# Patient Record
Sex: MALE | Race: WHITE | NOT HISPANIC OR LATINO
[De-identification: names, ages, dates, MRNs, and addresses within clinical notes are randomized per-mention and may not be internally consistent; named-entity substitution may affect disease eponyms.]

---

## 2023-02-24 PROBLEM — Z00.00 ENCOUNTER FOR PREVENTIVE HEALTH EXAMINATION: Status: ACTIVE | Noted: 2023-02-24

## 2023-03-06 ENCOUNTER — APPOINTMENT (OUTPATIENT)
Dept: UROLOGY | Facility: CLINIC | Age: 68
End: 2023-03-06
Payer: MEDICARE

## 2023-03-06 VITALS
RESPIRATION RATE: 18 BRPM | SYSTOLIC BLOOD PRESSURE: 147 MMHG | HEIGHT: 73 IN | BODY MASS INDEX: 24.52 KG/M2 | TEMPERATURE: 97.8 F | DIASTOLIC BLOOD PRESSURE: 89 MMHG | WEIGHT: 185 LBS | OXYGEN SATURATION: 98 % | HEART RATE: 103 BPM

## 2023-03-06 DIAGNOSIS — F17.210 NICOTINE DEPENDENCE, CIGARETTES, UNCOMPLICATED: ICD-10-CM

## 2023-03-06 DIAGNOSIS — I10 ESSENTIAL (PRIMARY) HYPERTENSION: ICD-10-CM

## 2023-03-06 DIAGNOSIS — E78.00 PURE HYPERCHOLESTEROLEMIA, UNSPECIFIED: ICD-10-CM

## 2023-03-06 PROCEDURE — 99204 OFFICE O/P NEW MOD 45 MIN: CPT

## 2023-03-08 NOTE — ADDENDUM
[FreeTextEntry1] : Documented by DOUG Koenig acting as a scribe for Dr. Kristen Coon \par \par All medical record entries made by the Scribe were at my, Dr. Coon direction and\par personally dictated by me.  I have reviewed the chart and agree that the record\par accurately reflects my personal performance of the history, physical exam, procedure and imaging.  \par  \par \par

## 2023-03-08 NOTE — LETTER BODY
[Dear  ___] : Dear ~EFREN, [Consult Letter:] : I had the pleasure of evaluating your patient, [unfilled]. [Please see my note below.] : Please see my note below. [Sincerely,] : Sincerely, [FreeTextEntry3] : Kristen Coon MD, FACS\par

## 2023-03-08 NOTE — HISTORY OF PRESENT ILLNESS
[FreeTextEntry1] : Alonzo is a 68-year-old male diagnosed with prostate cancer approximately 1 year ago with reported [place] grade of 8.  He was treated with radiation with Dr. Renan Allan and was receiving androgen deprivation therapy with Dr. Albarado.  Patient presents to office today to transfer care as his insurance has changed and Dr. Albarado no longer takes his insurance.\par \par Patient reports feeling well he denies any difficulties urinating.  He denies any bone pains and unexplained weight loss.\par \par His last hormone injection was approximately 3 months ago and he is unsure of how long it was recommended that he remain on hormone injections.\par \par No prior records available for review.\par \par His most recent PSA February 2023 is undetectable.

## 2023-03-08 NOTE — ASSESSMENT
[FreeTextEntry1] : 68-year-old with reported Norma 8 prostate cancer\par Treated with radiation and androgen deprivation therapy reportedly.\par He currently feels well.\par \par Transferring care due to changes of insurance.  No prior records available for review.\par \par I explained to patient the importance of his prior records from his prior urologist as well as his radiation oncologist.  I explained importance of continuity of care.\par \par Plan\par -Obtain prior records\par -Follow-up 3 weeks to review and to assess need for further ADT

## 2023-03-09 ENCOUNTER — NON-APPOINTMENT (OUTPATIENT)
Age: 68
End: 2023-03-09

## 2023-03-15 ENCOUNTER — NON-APPOINTMENT (OUTPATIENT)
Age: 68
End: 2023-03-15

## 2023-03-23 ENCOUNTER — APPOINTMENT (OUTPATIENT)
Dept: UROLOGY | Facility: CLINIC | Age: 68
End: 2023-03-23
Payer: MEDICARE

## 2023-03-23 VITALS
RESPIRATION RATE: 18 BRPM | SYSTOLIC BLOOD PRESSURE: 138 MMHG | WEIGHT: 185 LBS | BODY MASS INDEX: 24.52 KG/M2 | HEART RATE: 97 BPM | HEIGHT: 73 IN | TEMPERATURE: 97.1 F | OXYGEN SATURATION: 98 % | DIASTOLIC BLOOD PRESSURE: 77 MMHG

## 2023-03-23 PROCEDURE — 96402 CHEMO HORMON ANTINEOPL SQ/IM: CPT

## 2023-03-24 NOTE — HISTORY OF PRESENT ILLNESS
[FreeTextEntry1] : Alonzo is a 68-year-old male diagnosed with prostate cancer approximately 1 year ago with reported [place] grade of 8. He was treated with radiation with Dr. Renan Allan and was receiving androgen deprivation therapy with Dr. Albarado. Patient presents to office today to transfer care as his insurance has changed and Dr. Albarado no longer takes his insurance\par \par He presents to office today to review his prior records and for Eligard injection. His prior records were reviewed. \par Please see Chart note from 03/15/2023. \par \par As per Radiation Oncologist, it is recommended that patient continue ADT for a total of 18 months. With his prior Urologist, patient received 3 injections of Eligard 4 month depot (12 months). Due for 6 months of ADT.\par \par Patient reports feeling well. Denies dysuria and gross hematuria. \par Has follow up with Dr. Allan (radiation oncologist) in June 2023.

## 2023-03-24 NOTE — ASSESSMENT
[FreeTextEntry1] : 68 year old with multifocal prostate cancer. Norma 8 in 1 core. \par \par Old records reviewed. \par As per Radiation oncology, recommended that patient receive total of 18 months of ADT.\par Patient received 12 months with prior Urologist. \par \par He presents today for Eligard injection. \par Side effects of Eligard and indication for injection reviewed with patient. Patient verbalized understanding and all questions answered. \par \par Plan\par -Eligard today. See hormone note. \par -Follow up 3 months for final Eligard injection. \par

## 2023-06-26 ENCOUNTER — APPOINTMENT (OUTPATIENT)
Dept: UROLOGY | Facility: CLINIC | Age: 68
End: 2023-06-26
Payer: MEDICARE

## 2023-06-26 PROCEDURE — 96402 CHEMO HORMON ANTINEOPL SQ/IM: CPT

## 2023-09-28 ENCOUNTER — APPOINTMENT (OUTPATIENT)
Dept: UROLOGY | Facility: CLINIC | Age: 68
End: 2023-09-28
Payer: MEDICARE

## 2023-09-28 VITALS
DIASTOLIC BLOOD PRESSURE: 89 MMHG | SYSTOLIC BLOOD PRESSURE: 154 MMHG | RESPIRATION RATE: 18 BRPM | OXYGEN SATURATION: 98 % | HEIGHT: 73 IN | TEMPERATURE: 97 F | WEIGHT: 185 LBS | BODY MASS INDEX: 24.52 KG/M2 | HEART RATE: 89 BPM

## 2023-09-28 PROCEDURE — 99213 OFFICE O/P EST LOW 20 MIN: CPT

## 2024-03-28 ENCOUNTER — APPOINTMENT (OUTPATIENT)
Dept: UROLOGY | Facility: CLINIC | Age: 69
End: 2024-03-28
Payer: MEDICARE

## 2024-03-28 VITALS
BODY MASS INDEX: 24.52 KG/M2 | HEART RATE: 91 BPM | DIASTOLIC BLOOD PRESSURE: 84 MMHG | HEIGHT: 73 IN | WEIGHT: 185 LBS | SYSTOLIC BLOOD PRESSURE: 157 MMHG | OXYGEN SATURATION: 94 %

## 2024-03-28 DIAGNOSIS — C61 MALIGNANT NEOPLASM OF PROSTATE: ICD-10-CM

## 2024-03-28 PROCEDURE — 99214 OFFICE O/P EST MOD 30 MIN: CPT

## 2024-03-28 RX ORDER — ROSUVASTATIN CALCIUM 5 MG/1
5 TABLET, FILM COATED ORAL
Refills: 0 | Status: ACTIVE | COMMUNITY

## 2024-03-28 RX ORDER — ENALAPRIL MALEATE 5 MG/1
5 TABLET ORAL
Refills: 0 | Status: ACTIVE | COMMUNITY

## 2024-03-28 NOTE — ASSESSMENT
[FreeTextEntry1] : 69-year-old male diagnosed with prostate cancer approximately 1 year ago with reported [place] grade of 8.  He was treated with radiation with Dr. Renan Allan and was receiving androgen deprivation therapy with Dr. Albarado.  Patient presents to office today to transfer care as his insurance has changed and Dr. Albarado no longer takes his insurance  completed 18 months of ADT 6/2023  Patient reports feeling well. Denies dysuria and gross hematuria.  still with some hot flashes - but otherwise feels well  PSA <0.1 ng/ml 03/2024 PSA <0.1 ng/ml 12/2023 PSA <0.1 ng/ml 9/2023  he offers no complaints feels well  voiding without incident no adverse events associated with ADT were reported  Plan reviewed prior lab studies discussed the plan of care in detail - PSA with Dr. Sanders - f/u in 1 year to review possible PSA draw if not available at time of next visit

## 2024-03-28 NOTE — LETTER BODY
[Dear  ___] : Dear  [unfilled], [Consult Letter:] : I had the pleasure of evaluating your patient, [unfilled]. [Please see my note below.] : Please see my note below. [Sincerely,] : Sincerely, [FreeTextEntry3] : Kristen Coon MD, FACS

## 2024-03-28 NOTE — HISTORY OF PRESENT ILLNESS
[FreeTextEntry1] : 69-year-old male diagnosed with prostate cancer approximately 1 year ago with reported [place] grade of 8.  He was treated with radiation with Dr. Renan Allan and was receiving androgen deprivation therapy with Dr. Albarado.  Patient presents to office today to transfer care as his insurance has changed and Dr. Albarado no longer takes his insurance  completed 18 months of ADT 6/2023  Patient reports feeling well. Denies dysuria and gross hematuria.  still with some hot flashes - but otherwise feels well  PSA <0.1 ng/ml 03/2024 PSA <0.1 ng/ml 12/2023 PSA <0.1 ng/ml 9/2023  he offers no complaints feels well  voiding without incident no adverse events associated with ADT were reported [Urinary Urgency] : no urinary urgency [Urinary Retention] : no urinary retention [Urinary Frequency] : no urinary frequency [Straining] : no straining [Weak Stream] : no weak stream [Intermittency] : no intermittency [Erectile Dysfunction] : Erectile Dysfunction [Dysuria] : no dysuria [Hematuria - Gross] : no gross hematuria [Bladder Spasm] : no bladder spasm [Hematuria - Microscopic] : no microscopic hematuria [Abdominal Pain] : no abdominal pain

## 2024-03-28 NOTE — PHYSICAL EXAM
[Normal Appearance] : normal appearance [Well Groomed] : well groomed [General Appearance - In No Acute Distress] : no acute distress [Edema] : no peripheral edema [Respiration, Rhythm And Depth] : normal respiratory rhythm and effort [Exaggerated Use Of Accessory Muscles For Inspiration] : no accessory muscle use [Abdomen Tenderness] : non-tender [Abdomen Soft] : soft [Costovertebral Angle Tenderness] : no ~M costovertebral angle tenderness [Urinary Bladder Findings] : the bladder was normal on palpation [Normal Station and Gait] : the gait and station were normal for the patient's age [No Focal Deficits] : no focal deficits [] : no rash [Oriented To Time, Place, And Person] : oriented to person, place, and time [Affect] : the affect was normal [No Palpable Adenopathy] : no palpable adenopathy [Mood] : the mood was normal

## 2025-04-24 ENCOUNTER — APPOINTMENT (OUTPATIENT)
Dept: UROLOGY | Facility: CLINIC | Age: 70
End: 2025-04-24
Payer: MEDICARE

## 2025-04-24 DIAGNOSIS — C61 MALIGNANT NEOPLASM OF PROSTATE: ICD-10-CM

## 2025-04-24 PROCEDURE — 99214 OFFICE O/P EST MOD 30 MIN: CPT | Mod: 25

## 2025-04-24 PROCEDURE — 51798 US URINE CAPACITY MEASURE: CPT
